# Patient Record
Sex: FEMALE | Race: BLACK OR AFRICAN AMERICAN | NOT HISPANIC OR LATINO | Employment: OTHER | URBAN - METROPOLITAN AREA
[De-identification: names, ages, dates, MRNs, and addresses within clinical notes are randomized per-mention and may not be internally consistent; named-entity substitution may affect disease eponyms.]

---

## 2023-10-20 DIAGNOSIS — K59.09 CHRONIC CONSTIPATION: Primary | ICD-10-CM

## 2023-10-20 RX ORDER — LINACLOTIDE 290 UG/1
290 CAPSULE, GELATIN COATED ORAL
Qty: 90 CAPSULE | Refills: 0 | Status: SHIPPED | OUTPATIENT
Start: 2023-10-20

## 2023-10-20 RX ORDER — LINACLOTIDE 290 UG/1
290 CAPSULE, GELATIN COATED ORAL
COMMUNITY
End: 2023-10-20 | Stop reason: SDUPTHER

## 2024-02-15 DIAGNOSIS — R56.9 SEIZURE (MULTI): Primary | ICD-10-CM

## 2024-02-15 RX ORDER — LAMOTRIGINE 100 MG/1
1 TABLET ORAL 2 TIMES DAILY
COMMUNITY
End: 2024-02-15 | Stop reason: SDUPTHER

## 2024-02-15 RX ORDER — LAMOTRIGINE 100 MG/1
100 TABLET ORAL 2 TIMES DAILY
Qty: 60 TABLET | Refills: 11 | Status: SHIPPED | OUTPATIENT
Start: 2024-02-15 | End: 2024-03-04 | Stop reason: SDUPTHER

## 2024-02-19 ENCOUNTER — OFFICE VISIT (OUTPATIENT)
Dept: PRIMARY CARE | Facility: CLINIC | Age: 44
End: 2024-02-19
Payer: MEDICARE

## 2024-02-19 ENCOUNTER — LAB (OUTPATIENT)
Dept: LAB | Facility: LAB | Age: 44
End: 2024-02-19
Payer: MEDICARE

## 2024-02-19 VITALS
WEIGHT: 143.2 LBS | RESPIRATION RATE: 16 BRPM | HEART RATE: 68 BPM | TEMPERATURE: 97.9 F | DIASTOLIC BLOOD PRESSURE: 80 MMHG | SYSTOLIC BLOOD PRESSURE: 120 MMHG | HEIGHT: 61 IN | BODY MASS INDEX: 27.03 KG/M2

## 2024-02-19 DIAGNOSIS — G40.209 COMPLEX PARTIAL SEIZURE (MULTI): ICD-10-CM

## 2024-02-19 DIAGNOSIS — E55.9 VITAMIN D DEFICIENCY: ICD-10-CM

## 2024-02-19 DIAGNOSIS — Z00.00 ANNUAL PHYSICAL EXAM: ICD-10-CM

## 2024-02-19 DIAGNOSIS — K59.09 CHRONIC CONSTIPATION: ICD-10-CM

## 2024-02-19 DIAGNOSIS — Z00.00 ANNUAL PHYSICAL EXAM: Primary | ICD-10-CM

## 2024-02-19 DIAGNOSIS — Z12.31 SCREENING MAMMOGRAM, ENCOUNTER FOR: ICD-10-CM

## 2024-02-19 DIAGNOSIS — G80.9 CEREBRAL PALSY, UNSPECIFIED TYPE (MULTI): ICD-10-CM

## 2024-02-19 PROBLEM — A49.9 BACTERIAL UTI: Status: ACTIVE | Noted: 2024-02-19

## 2024-02-19 PROBLEM — H52.11 MYOPIA OF RIGHT EYE: Status: ACTIVE | Noted: 2024-02-19

## 2024-02-19 PROBLEM — H47.20 OPTIC ATROPHY OF BOTH EYES: Status: ACTIVE | Noted: 2024-02-19

## 2024-02-19 PROBLEM — H52.203 ASTIGMATISM OF BOTH EYES: Status: ACTIVE | Noted: 2024-02-19

## 2024-02-19 PROBLEM — M19.90 ARTHRITIS: Status: ACTIVE | Noted: 2024-02-19

## 2024-02-19 PROBLEM — K05.00 GINGIVITIS, ACUTE: Status: ACTIVE | Noted: 2024-02-19

## 2024-02-19 PROBLEM — L30.9 ECZEMA: Status: ACTIVE | Noted: 2024-02-19

## 2024-02-19 PROBLEM — H52.03 HYPERMETROPIA OF BOTH EYES: Status: ACTIVE | Noted: 2024-02-19

## 2024-02-19 PROBLEM — N39.0 BACTERIAL UTI: Status: ACTIVE | Noted: 2024-02-19

## 2024-02-19 PROBLEM — H52.02 HYPERMETROPIA OF LEFT EYE: Status: ACTIVE | Noted: 2024-02-19

## 2024-02-19 PROBLEM — R92.8 ABNORMAL MAMMOGRAM: Status: ACTIVE | Noted: 2024-02-19

## 2024-02-19 LAB
25(OH)D3 SERPL-MCNC: 41 NG/ML (ref 30–100)
ALBUMIN SERPL BCP-MCNC: 4.1 G/DL (ref 3.4–5)
ALP SERPL-CCNC: 61 U/L (ref 33–110)
ALT SERPL W P-5'-P-CCNC: 12 U/L (ref 7–45)
ANION GAP SERPL CALC-SCNC: 10 MMOL/L (ref 10–20)
AST SERPL W P-5'-P-CCNC: 12 U/L (ref 9–39)
BILIRUB SERPL-MCNC: 0.5 MG/DL (ref 0–1.2)
BUN SERPL-MCNC: 11 MG/DL (ref 6–23)
CALCIUM SERPL-MCNC: 8.7 MG/DL (ref 8.6–10.3)
CHLORIDE SERPL-SCNC: 106 MMOL/L (ref 98–107)
CHOLEST SERPL-MCNC: 157 MG/DL (ref 0–199)
CHOLESTEROL/HDL RATIO: 3.3
CO2 SERPL-SCNC: 27 MMOL/L (ref 21–32)
CREAT SERPL-MCNC: 0.73 MG/DL (ref 0.5–1.05)
EGFRCR SERPLBLD CKD-EPI 2021: >90 ML/MIN/1.73M*2
ERYTHROCYTE [DISTWIDTH] IN BLOOD BY AUTOMATED COUNT: 11.8 % (ref 11.5–14.5)
EST. AVERAGE GLUCOSE BLD GHB EST-MCNC: 88 MG/DL
GLUCOSE SERPL-MCNC: 86 MG/DL (ref 74–99)
HBA1C MFR BLD: 4.7 %
HCT VFR BLD AUTO: 38.8 % (ref 36–46)
HDLC SERPL-MCNC: 47.2 MG/DL
HGB BLD-MCNC: 12.6 G/DL (ref 12–16)
LDLC SERPL CALC-MCNC: 100 MG/DL
MCH RBC QN AUTO: 30.6 PG (ref 26–34)
MCHC RBC AUTO-ENTMCNC: 32.5 G/DL (ref 32–36)
MCV RBC AUTO: 94 FL (ref 80–100)
NON HDL CHOLESTEROL: 110 MG/DL (ref 0–149)
NRBC BLD-RTO: 0 /100 WBCS (ref 0–0)
PLATELET # BLD AUTO: 279 X10*3/UL (ref 150–450)
POTASSIUM SERPL-SCNC: 3.6 MMOL/L (ref 3.5–5.3)
PROT SERPL-MCNC: 6.9 G/DL (ref 6.4–8.2)
RBC # BLD AUTO: 4.12 X10*6/UL (ref 4–5.2)
SODIUM SERPL-SCNC: 139 MMOL/L (ref 136–145)
TRIGL SERPL-MCNC: 49 MG/DL (ref 0–149)
TSH SERPL-ACNC: 1.67 MIU/L (ref 0.44–3.98)
VLDL: 10 MG/DL (ref 0–40)
WBC # BLD AUTO: 8.7 X10*3/UL (ref 4.4–11.3)

## 2024-02-19 PROCEDURE — 84443 ASSAY THYROID STIM HORMONE: CPT

## 2024-02-19 PROCEDURE — 80061 LIPID PANEL: CPT

## 2024-02-19 PROCEDURE — 36415 COLL VENOUS BLD VENIPUNCTURE: CPT

## 2024-02-19 PROCEDURE — 99396 PREV VISIT EST AGE 40-64: CPT | Performed by: INTERNAL MEDICINE

## 2024-02-19 PROCEDURE — 80053 COMPREHEN METABOLIC PANEL: CPT

## 2024-02-19 PROCEDURE — 85027 COMPLETE CBC AUTOMATED: CPT

## 2024-02-19 PROCEDURE — 82306 VITAMIN D 25 HYDROXY: CPT

## 2024-02-19 PROCEDURE — 1036F TOBACCO NON-USER: CPT | Performed by: INTERNAL MEDICINE

## 2024-02-19 PROCEDURE — 83036 HEMOGLOBIN GLYCOSYLATED A1C: CPT
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
## 2024-02-28 ENCOUNTER — HOSPITAL ENCOUNTER (OUTPATIENT)
Dept: RADIOLOGY | Facility: CLINIC | Age: 44
Discharge: HOME | End: 2024-02-28
Payer: MEDICARE

## 2024-02-28 DIAGNOSIS — Z12.31 SCREENING MAMMOGRAM, ENCOUNTER FOR: ICD-10-CM

## 2024-02-28 DIAGNOSIS — Z00.00 ANNUAL PHYSICAL EXAM: ICD-10-CM

## 2024-02-28 PROCEDURE — 77063 BREAST TOMOSYNTHESIS BI: CPT | Performed by: RADIOLOGY

## 2024-02-28 PROCEDURE — 77067 SCR MAMMO BI INCL CAD: CPT | Performed by: RADIOLOGY

## 2024-02-28 PROCEDURE — 77067 SCR MAMMO BI INCL CAD: CPT

## 2024-03-04 ENCOUNTER — OFFICE VISIT (OUTPATIENT)
Dept: NEUROLOGY | Facility: CLINIC | Age: 44
End: 2024-03-04
Payer: MEDICARE

## 2024-03-04 VITALS
BODY MASS INDEX: 28.15 KG/M2 | HEART RATE: 80 BPM | SYSTOLIC BLOOD PRESSURE: 98 MMHG | DIASTOLIC BLOOD PRESSURE: 66 MMHG | RESPIRATION RATE: 18 BRPM | WEIGHT: 149 LBS

## 2024-03-04 DIAGNOSIS — R56.9 SEIZURE (MULTI): ICD-10-CM

## 2024-03-04 PROCEDURE — 99214 OFFICE O/P EST MOD 30 MIN: CPT | Performed by: NURSE PRACTITIONER

## 2024-03-04 PROCEDURE — 1036F TOBACCO NON-USER: CPT | Performed by: NURSE PRACTITIONER

## 2024-03-04 RX ORDER — LAMOTRIGINE 100 MG/1
100 TABLET ORAL 2 TIMES DAILY
Qty: 180 TABLET | Refills: 3 | Status: SHIPPED | OUTPATIENT
Start: 2024-03-04 | End: 2025-03-04

## 2024-03-04 ASSESSMENT — PAIN SCALES - GENERAL: PAINLEVEL: 0-NO PAIN
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
## 2024-04-28 ASSESSMENT — EXTERNAL EXAM - LEFT EYE: OS_EXAM: NORMAL

## 2024-04-28 ASSESSMENT — EXTERNAL EXAM - RIGHT EYE: OD_EXAM: NORMAL

## 2024-04-28 ASSESSMENT — CUP TO DISC RATIO
OD_RATIO: .45
OS_RATIO: .4

## 2024-04-28 ASSESSMENT — SLIT LAMP EXAM - LIDS
COMMENTS: GOOD POSITION
COMMENTS: GOOD POSITION
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
## 2024-04-29 ENCOUNTER — OFFICE VISIT (OUTPATIENT)
Dept: OPHTHALMOLOGY | Facility: CLINIC | Age: 44
End: 2024-04-29
Payer: MEDICARE

## 2024-04-29 DIAGNOSIS — H52.203 ASTIGMATISM OF BOTH EYES, UNSPECIFIED TYPE: ICD-10-CM

## 2024-04-29 DIAGNOSIS — H52.03 HYPERMETROPIA OF BOTH EYES: ICD-10-CM

## 2024-04-29 DIAGNOSIS — H50.111 EXOTROPIA, RIGHT EYE: ICD-10-CM

## 2024-04-29 DIAGNOSIS — H47.20 OPTIC ATROPHY OF BOTH EYES: Primary | ICD-10-CM

## 2024-04-29 PROCEDURE — 92014 COMPRE OPH EXAM EST PT 1/>: CPT | Performed by: OPHTHALMOLOGY

## 2024-04-29 PROCEDURE — 92015 DETERMINE REFRACTIVE STATE: CPT | Performed by: OPHTHALMOLOGY

## 2024-04-29 ASSESSMENT — ENCOUNTER SYMPTOMS
RESPIRATORY NEGATIVE: 0
CONSTITUTIONAL NEGATIVE: 0
PSYCHIATRIC NEGATIVE: 0
GASTROINTESTINAL NEGATIVE: 0
EYES NEGATIVE: 1
ALLERGIC/IMMUNOLOGIC NEGATIVE: 0
NEUROLOGICAL NEGATIVE: 0
MUSCULOSKELETAL NEGATIVE: 0
HEMATOLOGIC/LYMPHATIC NEGATIVE: 0
CARDIOVASCULAR NEGATIVE: 0
ENDOCRINE NEGATIVE: 0

## 2024-04-29 ASSESSMENT — REFRACTION_WEARINGRX
OD_SPHERE: +0.50
OS_CYLINDER: -1.50
OD_CYLINDER: -3.50
OD_AXIS: 050
OS_AXIS: 160
OS_SPHERE: +1.75

## 2024-04-29 ASSESSMENT — CONF VISUAL FIELD
OD_SUPERIOR_NASAL_RESTRICTION: 0
OS_INFERIOR_NASAL_RESTRICTION: 0
OD_SUPERIOR_TEMPORAL_RESTRICTION: 0
OD_INFERIOR_NASAL_RESTRICTION: 0
OS_INFERIOR_TEMPORAL_RESTRICTION: 0
OS_NORMAL: 1
OS_SUPERIOR_TEMPORAL_RESTRICTION: 0
OD_NORMAL: 1
OS_SUPERIOR_NASAL_RESTRICTION: 0
OD_INFERIOR_TEMPORAL_RESTRICTION: 0

## 2024-04-29 ASSESSMENT — TONOMETRY
IOP_METHOD: DIGITAL PALPATION
OD_IOP_MMHG: SOFT
OS_IOP_MMHG: SOFT

## 2024-04-29 ASSESSMENT — REFRACTION_MANIFEST
OD_SPHERE: +0.25
OD_ADD: +1.25
OS_ADD: +1.25
OS_CYLINDER: -1.75
OS_SPHERE: +2.00
OD_CYLINDER: -3.50
OS_AXIS: 160
OD_AXIS: 050

## 2024-04-29 ASSESSMENT — VISUAL ACUITY
OS_CC: 20/40
CORRECTION_TYPE: GLASSES
METHOD: SNELLEN - LINEAR
OD_CC: 20/40

## 2024-09-19 ENCOUNTER — TELEPHONE (OUTPATIENT)
Dept: GASTROENTEROLOGY | Facility: HOSPITAL | Age: 44
End: 2024-09-19
Payer: MEDICARE

## 2024-09-19 DIAGNOSIS — K59.00 CONSTIPATION, UNSPECIFIED CONSTIPATION TYPE: Primary | ICD-10-CM

## 2024-09-19 RX ORDER — POLYETHYLENE GLYCOL 3350, SODIUM SULFATE ANHYDROUS, SODIUM BICARBONATE, SODIUM CHLORIDE, POTASSIUM CHLORIDE 236; 22.74; 6.74; 5.86; 2.97 G/4L; G/4L; G/4L; G/4L; G/4L
4 POWDER, FOR SOLUTION ORAL ONCE
Qty: 4000 ML | Refills: 0 | Status: SHIPPED | OUTPATIENT
Start: 2024-09-19 | End: 2024-09-19

## 2024-09-19 NOTE — TELEPHONE ENCOUNTER
Patient has not had a bowel movement in 2 weeks  Can a bowel prep be sent to the [facility] in [facility].?  Mom: [phone]

## 2024-09-25 ENCOUNTER — OFFICE VISIT (OUTPATIENT)
Dept: GASTROENTEROLOGY | Facility: CLINIC | Age: 44
End: 2024-09-25
Payer: MEDICARE

## 2024-09-25 VITALS
SYSTOLIC BLOOD PRESSURE: 112 MMHG | BODY MASS INDEX: 27.56 KG/M2 | HEART RATE: 79 BPM | TEMPERATURE: 97.7 F | WEIGHT: 146 LBS | HEIGHT: 61 IN | DIASTOLIC BLOOD PRESSURE: 68 MMHG

## 2024-09-25 DIAGNOSIS — K59.04 CHRONIC IDIOPATHIC CONSTIPATION: Primary | ICD-10-CM

## 2024-09-25 PROCEDURE — 3008F BODY MASS INDEX DOCD: CPT | Performed by: NURSE PRACTITIONER

## 2024-09-25 PROCEDURE — 99214 OFFICE O/P EST MOD 30 MIN: CPT | Performed by: NURSE PRACTITIONER

## 2024-09-25 PROCEDURE — 1036F TOBACCO NON-USER: CPT | Performed by: NURSE PRACTITIONER

## 2024-09-25 RX ORDER — ACETAMINOPHEN 500 MG
1 TABLET ORAL DAILY
COMMUNITY

## 2024-09-25 RX ORDER — LACTULOSE 10 G/15ML
SOLUTION ORAL; RECTAL
Qty: 473 ML | Refills: 2 | Status: SHIPPED | OUTPATIENT
Start: 2024-09-25 | End: 2024-09-26

## 2024-09-25 NOTE — PROGRESS NOTES
Subjective   Patient ID: Halley Huerta is a 44 y.o. female.    HPI  44-year-old female for follow-up visit for chronic idiopathic constipation  Previously seen 4/13/2022  At that time she had been on Linzess 290 mcg daily and was doing well  8/23/2023 she had a normal colonoscopy  2/19/2024 labs reviewed hemoglobin A1c 4.7%  Vitamin D 41  TSH 1.67  Normal CBC and CMP      Objective   Physical Exam  Cardiovascular:      Rate and Rhythm: Normal rate and regular rhythm.      Pulses: Normal pulses.      Heart sounds: Normal heart sounds.   Pulmonary:      Effort: Pulmonary effort is normal.      Breath sounds: Normal breath sounds.   Abdominal:      General: Bowel sounds are normal.      Palpations: Abdomen is soft.         Assessment/Plan

## 2024-09-25 NOTE — PATIENT INSTRUCTIONS
Chronic idiopathetic constipation- I would recommend continuing the diet changes and only eating cheese once a week to help prevent constipation. You can continue to drink the Green machine juice as this has been helping. I will order lactulose that you can use if you have not had a bowel movement in 2-3 days then you can use this.     If you notice that you are suing the medication more frequently or on a more regular basis then please let me know and we can discuss changing you to a different medication if needed.

## 2024-09-25 NOTE — PROGRESS NOTES
Subjective   Patient ID: Halley Huerta is a 44 y.o. female who presents for New Patient Visit.  HPI  44-year-old female for follow-up visit for chronic idiopathic constipation  Previously seen 4/13/2022\  Here with mom  At that time she was on Linzess 290 mcg daily- didn't feel it worked and stopped  8/23/2022 colonoscopy was normal  2/19/2024 labs reviewed hemoglobin A1c 4.7%  Vitamin D 41  TSH 1.67  Normal CBC and CMP  Went off the linzess and was using Green machine and was helping  Until 2 weeks ago then stopped moving her bowels  Was using miralax, Milk of magnesium  Had gone 2 weeks with out a Bm and then went and got cleaned out  No abd pain  Abd feels soft  Fiber- grapes, watermelon, breads, salads, spinach  Water- 3-4  16 oz per day  Drinks tea and juice as well  Letting her mom know when she is having a BM  Objective   Physical Exam  Cardiovascular:      Rate and Rhythm: Normal rate and regular rhythm.      Pulses: Normal pulses.      Heart sounds: Normal heart sounds.   Pulmonary:      Effort: Pulmonary effort is normal.      Breath sounds: Normal breath sounds.   Abdominal:      General: Bowel sounds are normal.      Palpations: Abdomen is soft.         Assessment/Plan        Chronic idiopathetic constipation- I would recommend continuing the diet changes and only eating cheese once a week to help prevent constipation. You can continue to drink the Green machine juice as this has been helping. I will order lactulose that you can use if you have not had a bowel movement in 2-3 days then you can use this.     If you notice that you are suing the medication more frequently or on a more regular basis then please let me know and we can discuss changing you to a different medication if needed.        Mickie Strickland, GENEVA-CNP 09/25/24 3:28 PM    SMALL (*)     Leukocyte Esterase, Urine SMALL (*)     All other components within normal limits   MICROSCOPIC URINALYSIS - Abnormal; Notable for the following:     RBC, UA 3-5 (*)     All other components within normal limits   URINE CULTURE   COMPREHENSIVE METABOLIC PANEL       All other labs were within normal range or not returned as of this dictation. EMERGENCY DEPARTMENT COURSE and DIFFERENTIAL DIAGNOSIS/MDM:   Vitals:    Vitals:    08/17/18 1411 08/17/18 1704   BP: (!) 175/84 (!) 169/87   Pulse: 66 68   Resp: 14 16   Temp: 97.9 °F (36.6 °C)    TempSrc: Oral    SpO2: 97%    Weight: 180 lb (81.6 kg)    Height: 5' 8\" (1.727 m)             MDM on evaluation patient is nontoxic in no distress. She has no complaints. Vital signs are noted. Exam is grossly unremarkable. She is alert oriented has no neurological deficits and ambulatory without any deficit. She ambulated with a steady gait. I will obtain laboratory studies for evaluation. Patient will be reevaluated. Signed patient was reevaluated, her laboratory studies did not demonstrate acute pathology. Patient remains in stable condition as initial evaluation and is requesting to be sent home. Patient does not meet any type of admission criteria or observation criteria she herself actually has no complaints. Care was reviewed and discussed with the patient as well as the daughter at the bedside. They're agreeable to plan. Discharged home in stable condition. Standard anticipatory guidance given to patient upon discharge. Have given them a specific time frame in which to follow-up and who to follow-up with. I have also advised them that they should return to the emergency department if they get worse, or not getting better or develop any new or concerning symptoms. Patient demonstrates understanding and all questions were answered.     CRITICAL CARE TIME       CONSULTS:  None    PROCEDURES:  Unless otherwise noted below, none

## 2024-09-26 ENCOUNTER — TELEPHONE (OUTPATIENT)
Dept: GASTROENTEROLOGY | Facility: HOSPITAL | Age: 44
End: 2024-09-26
Payer: MEDICARE

## 2024-09-26 DIAGNOSIS — K59.04 CHRONIC IDIOPATHIC CONSTIPATION: ICD-10-CM

## 2024-09-26 RX ORDER — LACTULOSE 10 G/15ML
10 SOLUTION ORAL 2 TIMES DAILY PRN
Qty: 900 ML | Refills: 1 | Status: SHIPPED | OUTPATIENT
Start: 2024-09-26 | End: 2024-10-26

## 2024-09-26 NOTE — TELEPHONE ENCOUNTER
Amadou Gipson calling for clarification on dosage and frequency on Lactulose  [phone]   Medication filled

## 2024-10-24 ENCOUNTER — APPOINTMENT (OUTPATIENT)
Dept: GASTROENTEROLOGY | Facility: CLINIC | Age: 44
End: 2024-10-24
Payer: MEDICARE

## 2025-02-21 ENCOUNTER — APPOINTMENT (OUTPATIENT)
Dept: PRIMARY CARE | Facility: CLINIC | Age: 45
End: 2025-02-21
Payer: MEDICARE

## 2025-02-21 VITALS
HEIGHT: 61 IN | WEIGHT: 145.6 LBS | DIASTOLIC BLOOD PRESSURE: 70 MMHG | RESPIRATION RATE: 16 BRPM | SYSTOLIC BLOOD PRESSURE: 120 MMHG | TEMPERATURE: 98.3 F | BODY MASS INDEX: 27.49 KG/M2 | HEART RATE: 74 BPM

## 2025-02-21 DIAGNOSIS — R53.83 OTHER FATIGUE: ICD-10-CM

## 2025-02-21 DIAGNOSIS — K59.09 CHRONIC CONSTIPATION: ICD-10-CM

## 2025-02-21 DIAGNOSIS — E55.9 VITAMIN D DEFICIENCY: Primary | ICD-10-CM

## 2025-02-21 DIAGNOSIS — Z00.00 ANNUAL PHYSICAL EXAM: ICD-10-CM

## 2025-02-21 DIAGNOSIS — G80.9 CEREBRAL PALSY, UNSPECIFIED TYPE (MULTI): ICD-10-CM

## 2025-02-21 DIAGNOSIS — L30.9 ECZEMA, UNSPECIFIED TYPE: ICD-10-CM

## 2025-02-21 DIAGNOSIS — Z12.31 SCREENING MAMMOGRAM, ENCOUNTER FOR: ICD-10-CM

## 2025-02-21 DIAGNOSIS — G40.209 COMPLEX PARTIAL SEIZURE (MULTI): ICD-10-CM

## 2025-02-21 RX ORDER — TRIAMCINOLONE ACETONIDE 0.25 MG/G
1 CREAM TOPICAL 2 TIMES DAILY
COMMUNITY
Start: 2020-01-17

## 2025-02-21 ASSESSMENT — COLUMBIA-SUICIDE SEVERITY RATING SCALE - C-SSRS
6. HAVE YOU EVER DONE ANYTHING, STARTED TO DO ANYTHING, OR PREPARED TO DO ANYTHING TO END YOUR LIFE?: NO
2. HAVE YOU ACTUALLY HAD ANY THOUGHTS OF KILLING YOURSELF?: NO
1. IN THE PAST MONTH, HAVE YOU WISHED YOU WERE DEAD OR WISHED YOU COULD GO TO SLEEP AND NOT WAKE UP?: NO

## 2025-02-21 ASSESSMENT — PATIENT HEALTH QUESTIONNAIRE - PHQ9
2. FEELING DOWN, DEPRESSED OR HOPELESS: NOT AT ALL
SUM OF ALL RESPONSES TO PHQ9 QUESTIONS 1 AND 2: 0
1. LITTLE INTEREST OR PLEASURE IN DOING THINGS: NOT AT ALL
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
## 2025-02-22 LAB
25(OH)D3+25(OH)D2 SERPL-MCNC: 51 NG/ML (ref 30–100)
ALBUMIN SERPL-MCNC: 4.2 G/DL (ref 3.6–5.1)
ALP SERPL-CCNC: 54 U/L (ref 31–125)
ALT SERPL-CCNC: 13 U/L (ref 6–29)
ANION GAP SERPL CALCULATED.4IONS-SCNC: 6 MMOL/L (CALC) (ref 7–17)
AST SERPL-CCNC: 13 U/L (ref 10–35)
BILIRUB SERPL-MCNC: 0.4 MG/DL (ref 0.2–1.2)
BUN SERPL-MCNC: 8 MG/DL (ref 7–25)
CALCIUM SERPL-MCNC: 9.2 MG/DL (ref 8.6–10.2)
CHLORIDE SERPL-SCNC: 108 MMOL/L (ref 98–110)
CHOLEST SERPL-MCNC: 176 MG/DL
CHOLEST/HDLC SERPL: 3.7 (CALC)
CO2 SERPL-SCNC: 27 MMOL/L (ref 20–32)
CREAT SERPL-MCNC: 0.81 MG/DL (ref 0.5–0.99)
EGFRCR SERPLBLD CKD-EPI 2021: 91 ML/MIN/1.73M2
ERYTHROCYTE [DISTWIDTH] IN BLOOD BY AUTOMATED COUNT: 11.6 % (ref 11–15)
EST. AVERAGE GLUCOSE BLD GHB EST-MCNC: 100 MG/DL
EST. AVERAGE GLUCOSE BLD GHB EST-SCNC: 5.5 MMOL/L
GLUCOSE SERPL-MCNC: 93 MG/DL (ref 65–99)
HBA1C MFR BLD: 5.1 % OF TOTAL HGB
HCT VFR BLD AUTO: 38.1 % (ref 35–45)
HDLC SERPL-MCNC: 48 MG/DL
HGB BLD-MCNC: 12 G/DL (ref 11.7–15.5)
LDLC SERPL CALC-MCNC: 114 MG/DL (CALC)
MCH RBC QN AUTO: 29.6 PG (ref 27–33)
MCHC RBC AUTO-ENTMCNC: 31.5 G/DL (ref 32–36)
MCV RBC AUTO: 93.8 FL (ref 80–100)
NONHDLC SERPL-MCNC: 128 MG/DL (CALC)
PLATELET # BLD AUTO: 306 THOUSAND/UL (ref 140–400)
PMV BLD REES-ECKER: 9.4 FL (ref 7.5–12.5)
POTASSIUM SERPL-SCNC: 4 MMOL/L (ref 3.5–5.3)
PROT SERPL-MCNC: 6.9 G/DL (ref 6.1–8.1)
RBC # BLD AUTO: 4.06 MILLION/UL (ref 3.8–5.1)
SODIUM SERPL-SCNC: 141 MMOL/L (ref 135–146)
TRIGL SERPL-MCNC: 54 MG/DL
TSH SERPL-ACNC: 1.27 MIU/L
WBC # BLD AUTO: 8 THOUSAND/UL (ref 3.8–10.8)

## 2025-02-26 DIAGNOSIS — L30.9 ECZEMA, UNSPECIFIED TYPE: Primary | ICD-10-CM

## 2025-02-26 RX ORDER — TRIAMCINOLONE ACETONIDE 0.25 MG/G
1 CREAM TOPICAL 2 TIMES DAILY
Qty: 45 G | Refills: 0 | Status: SHIPPED | OUTPATIENT
Start: 2025-02-26

## 2025-02-27 DIAGNOSIS — R56.9 SEIZURE (MULTI): ICD-10-CM

## 2025-02-27 RX ORDER — LAMOTRIGINE 100 MG/1
TABLET ORAL 2 TIMES DAILY
Qty: 180 TABLET | Refills: 0 | Status: SHIPPED | OUTPATIENT
Start: 2025-02-27

## 2025-03-04 ENCOUNTER — OFFICE VISIT (OUTPATIENT)
Dept: NEUROLOGY | Facility: CLINIC | Age: 45
End: 2025-03-04
Payer: MEDICARE

## 2025-03-04 VITALS
HEART RATE: 74 BPM | RESPIRATION RATE: 18 BRPM | SYSTOLIC BLOOD PRESSURE: 108 MMHG | DIASTOLIC BLOOD PRESSURE: 65 MMHG | BODY MASS INDEX: 27.21 KG/M2 | WEIGHT: 144 LBS

## 2025-03-04 DIAGNOSIS — R56.9 SEIZURE (MULTI): ICD-10-CM

## 2025-03-04 PROCEDURE — 1036F TOBACCO NON-USER: CPT | Performed by: NURSE PRACTITIONER

## 2025-03-04 PROCEDURE — 99214 OFFICE O/P EST MOD 30 MIN: CPT | Performed by: NURSE PRACTITIONER

## 2025-03-04 RX ORDER — LAMOTRIGINE 100 MG/1
100 TABLET ORAL 2 TIMES DAILY
Qty: 180 TABLET | Refills: 3 | Status: SHIPPED | OUTPATIENT
Start: 2025-03-04 | End: 2026-03-04

## 2025-03-04 ASSESSMENT — PAIN SCALES - GENERAL: PAINLEVEL_OUTOF10: 0-NO PAIN
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
## 2025-03-07 ENCOUNTER — HOSPITAL ENCOUNTER (OUTPATIENT)
Dept: RADIOLOGY | Facility: CLINIC | Age: 45
Discharge: HOME | End: 2025-03-07
Payer: MEDICAID

## 2025-03-07 DIAGNOSIS — Z12.31 SCREENING MAMMOGRAM FOR BREAST CANCER: ICD-10-CM

## 2025-03-07 DIAGNOSIS — Z12.31 SCREENING MAMMOGRAM, ENCOUNTER FOR: ICD-10-CM

## 2025-03-07 DIAGNOSIS — Z00.00 ANNUAL PHYSICAL EXAM: ICD-10-CM

## 2025-03-07 PROCEDURE — 77067 SCR MAMMO BI INCL CAD: CPT

## 2025-03-07 PROCEDURE — 77063 BREAST TOMOSYNTHESIS BI: CPT | Performed by: RADIOLOGY

## 2025-03-07 PROCEDURE — 77067 SCR MAMMO BI INCL CAD: CPT | Performed by: RADIOLOGY

## 2025-05-05 ENCOUNTER — APPOINTMENT (OUTPATIENT)
Dept: OPHTHALMOLOGY | Facility: CLINIC | Age: 45
End: 2025-05-05
Payer: MEDICARE

## 2026-02-23 ENCOUNTER — APPOINTMENT (OUTPATIENT)
Dept: PRIMARY CARE | Facility: CLINIC | Age: 46
End: 2026-02-23
Payer: MEDICARE